# Patient Record
Sex: FEMALE | Race: BLACK OR AFRICAN AMERICAN | Employment: OTHER | URBAN - METROPOLITAN AREA
[De-identification: names, ages, dates, MRNs, and addresses within clinical notes are randomized per-mention and may not be internally consistent; named-entity substitution may affect disease eponyms.]

---

## 2017-07-31 ENCOUNTER — HOSPITAL ENCOUNTER (EMERGENCY)
Age: 82
Discharge: HOME | End: 2017-07-31
Attending: EMERGENCY MEDICINE
Payer: MEDICARE

## 2017-07-31 VITALS
HEIGHT: 62 IN | SYSTOLIC BLOOD PRESSURE: 119 MMHG | WEIGHT: 194 LBS | TEMPERATURE: 97.7 F | OXYGEN SATURATION: 98 % | DIASTOLIC BLOOD PRESSURE: 89 MMHG | BODY MASS INDEX: 35.7 KG/M2 | HEART RATE: 79 BPM | RESPIRATION RATE: 16 BRPM

## 2017-07-31 DIAGNOSIS — M54.31 BILATERAL SCIATICA: Primary | ICD-10-CM

## 2017-07-31 DIAGNOSIS — M54.32 BILATERAL SCIATICA: Primary | ICD-10-CM

## 2017-07-31 PROCEDURE — 99283 EMERGENCY DEPT VISIT LOW MDM: CPT

## 2017-07-31 RX ORDER — METHYLPREDNISOLONE 4 MG/1
TABLET ORAL
Qty: 1 DOSE PACK | Refills: 0 | Status: SHIPPED | OUTPATIENT
Start: 2017-07-31

## 2017-07-31 NOTE — ED PROVIDER NOTES
[address]  EMERGENCY DEPARTMENT HISTORY AND PHYSICAL EXAM       Date: 7/31/2017   Patient Name: Janna Smoker   YOB: 1935  Medical Record Number: [id number]    History of Presenting Illness     Chief Complaint   Patient presents with    Back Pain        History Provided By:  patient    Additional History: 12:50 PM  Janna Smoker is a 80 y.o. female with hx of sciatica who presents to the emergency department C/O intermittent bilateral lower back pain, worse on the right, starting 3 months ago. The pain starts at the upper, lateral buttocks, and occasionally radiates down the right posterior thigh. The pain is exacerbated by sitting and with certain positions, and is alleviated with stretching. Pt states she has follow up with PCP scheduled. Denies numbness, tingling, bowel/bladder incontinence, and any other sxs or complaints. Primary Care Provider: Shruthi Kaur MD   Specialist:    Past History     Past Medical History:   Past Medical History:   Diagnosis Date    Acid reflux     Hypertension     Trigeminal neuralgia syndrome         Past Surgical History:   Past Surgical History:   Procedure Laterality Date    HX BREAST REDUCTION  1985    Bernardino. br.     HX HYSTERECTOMY      Age 48    HX OTHER SURGICAL      trigemenal injections        Family History:   History reviewed. No pertinent family history. Social History:   Social History   Substance Use Topics    Smoking status: Never Smoker    Smokeless tobacco: Never Used    Alcohol use No        Allergies: Allergies   Allergen Reactions    Cetirizine Nausea and Vomiting    Other Medication Rash     Unknown name of medication        Review of Systems   Review of Systems   Gastrointestinal:        (-) bowel incontinence   Genitourinary:        (-) urinary incontinence   Musculoskeletal: Positive for back pain (lower, worse on right). Negative for gait problem.    Neurological: Negative for weakness and numbness. All other systems reviewed and are negative. Physical Exam  Vitals:    07/31/17 1215   BP: 119/89   Pulse: 79   Resp: 16   Temp: 97.7 °F (36.5 °C)   SpO2: 98%   Weight: 88 kg (194 lb)   Height: 5' 2\" (1.575 m)       Physical Exam   Constitutional: She is oriented to person, place, and time. Vital signs are normal. She appears well-developed and well-nourished. No distress. HENT:   Head: Normocephalic and atraumatic. Eyes: Conjunctivae are normal.   Neck: Normal range of motion. Neck supple. Cardiovascular: Normal rate, regular rhythm and normal heart sounds. Pulmonary/Chest: Effort normal and breath sounds normal. No respiratory distress. Abdominal: Soft. Bowel sounds are normal.   Musculoskeletal: Normal range of motion. She exhibits tenderness (to bilateral buttocks (upper and lateral)). Right hip: Normal.        Left hip: Normal.        Lumbar back: Normal.        Back:    Full strength and sensation in bilateral lower extremities. Neurological: She is alert and oriented to person, place, and time. PF/DF intact   Skin: Skin is warm and dry. She is not diaphoretic. Psychiatric: She has a normal mood and affect. Her behavior is normal.   Nursing note and vitals reviewed. Diagnostic Study Results     Labs -    No results found for this or any previous visit (from the past 12 hour(s)). Radiologic Studies -  The following have been ordered and reviewed:  No orders to display       Medical Decision Making   I am the first provider for this patient. I reviewed the vital signs, available nursing notes, past medical history, past surgical history, family history and social history. Vital Signs-Reviewed the patient's vital signs.    Patient Vitals for the past 12 hrs:   Temp Pulse Resp BP SpO2   07/31/17 1215 97.7 °F (36.5 °C) 79 16 119/89 98 %       Pulse Oximetry Analysis - Normal 98% on room air     Procedures:   Procedures    ED Course:  12:50 PM  Initial assessment performed. The patients presenting problems have been discussed, and they are in agreement with the care plan formulated and outlined with them. I have encouraged them to ask questions as they arise throughout their visit. Medications Given in the ED:  Medications - No data to display    Discharge Note:  1:11 PM   Pt has been reexamined. Patient has no new complaints, changes, or physical findings. Care plan outlined and precautions discussed. All medications were reviewed with the patient; will d/c home with Medrol Mariela. All of pt's questions and concerns were addressed. Patient was instructed and agrees to follow up with her PCP, as well as to return to the ED upon further deterioration. Patient is ready to go home. Diagnosis   Clinical Impression:   1. Bilateral sciatica         Follow-up Information     Follow up With Details Comments Contact Info    Romero Avendano MD Go to Your appointment as scheduled [address]   [phone]      [facility] EMERGENCY DEPT  As needed, If symptoms worsen [address] [id number]  [phone]          Discharge Medication List as of 7/31/2017  1:11 PM      START taking these medications    Details   methylPREDNISolone (MEDROL, MARIELA,) 4 mg tablet Take as directed  Indications: INFLAMMATION, SCIATICA, Print, Disp-1 Dose Pack, R-0         CONTINUE these medications which have NOT CHANGED    Details   ondansetron (ZOFRAN ODT) 4 mg disintegrating tablet Take 1 Tab by mouth every eight (8) hours as needed for Nausea. , Print, Disp-6 Tab, R-0      omeprazole (PRILOSEC) 20 mg capsule Take 1 Cap by mouth daily. , Print, Disp-15 Cap, R-0      LISINOPRIL PO Take 20 mg by mouth., Historical Med      CARBAMAZEPINE (TEGRETOL PO) Take 200 mg by mouth., Historical Med      calcium 500 mg tab Take  by mouth., Historical Med      Cholecalciferol, Vitamin D3, (VITAMIN D3) 1,000 unit cap Take  by mouth., Historical Med _______________________________   Attestations: This note is prepared by Brittney Gay, acting as a Scribe for [employer], PA-C. on 1:07 PM on 7/31/2017 . [employer], PA-C.: The scribe's documentation has been prepared under my direction and personally reviewed by me in its entirety.   _______________________________

## 2017-07-31 NOTE — ED NOTES
Amb into ed w/ reports bilateral hip pain radiating down both legs x 3 months now - pt reports this is her typical sciatica - had appointment w/ PCP today - someone made a mistake about the time of her appointment and PCP could not see her today - pt drove self to ED.

## 2017-07-31 NOTE — DISCHARGE INSTRUCTIONS
Sciatica: Care Instructions  Your Care Instructions    Sciatica (say \"fwq-FU-ji-kuh\") is an irritation of one of the sciatic nerves, which come from the spinal cord in the lower back. The sciatic nerves and their branches extend down through the buttock to the foot. Sciatica can develop when an injured disc in the back presses against a spinal nerve root. Its main symptom is pain, numbness, or weakness that is often worse in the leg or foot than in the back. Sciatica often will improve and go away with time. Early treatment usually includes medicines and exercises to relieve pain. Follow-up care is a key part of your treatment and safety. Be sure to make and go to all appointments, and call your doctor if you are having problems. It's also a good idea to know your test results and keep a list of the medicines you take. How can you care for yourself at home? · Take pain medicines exactly as directed. ¨ If the doctor gave you a prescription medicine for pain, take it as prescribed. ¨ If you are not taking a prescription pain medicine, ask your doctor if you can take an over-the-counter medicine. · Use heat or ice to relieve pain. ¨ To apply heat, put a warm water bottle, heating pad set on low, or warm cloth on your back. Do not go to sleep with a heating pad on your skin. ¨ To use ice, put ice or a cold pack on the area for 10 to 20 minutes at a time. Put a thin cloth between the ice and your skin. · Avoid sitting if possible, unless it feels better than standing. · Alternate lying down with short walks. Increase your walking distance as you are able to without making your symptoms worse. · Do not do anything that makes your symptoms worse. When should you call for help? Call 911 anytime you think you may need emergency care. For example, call if:  · You are unable to move a leg at all.   Call your doctor now or seek immediate medical care if:  · You have new or worse symptoms in your legs or buttocks. Symptoms may include:  ¨ Numbness or tingling. ¨ Weakness. ¨ Pain. · You lose bladder or bowel control. Watch closely for changes in your health, and be sure to contact your doctor if:  · You are not getting better as expected. Where can you learn more? Go to http://minor-estefania.info/. Enter [phone] in the search box to learn more about \"Sciatica: Care Instructions. \"  Current as of: March 21, 2017  Content Version: 11.3  © 3886-3553 [employer]. Care instructions adapted under license by [employer] (which disclaims liability or warranty for this information). If you have questions about a medical condition or this instruction, always ask your healthcare professional. [address] any warranty or liability for your use of this information.

## 2017-07-31 NOTE — ED TRIAGE NOTES
Pt states hx of sciatica pt reports has pain down both legs, onset 3 months ago, pt states she had an appointment with her PCP but showed up at wrong time so was unable to be seen, pt reports last episode did not last this long, denies injury   Sepsis Screening completed    (  )Patient meets SIRS criteria. (x  )Patient does not meet SIRS criteria.       SIRS Criteria is achieved when two or more of the following are present   Temperature < 96.8°F (36°C) or > 100.9°F (38.3°C)   Heart Rate > 90 beats per minute   Respiratory Rate > 20 breaths per minute   WBC count > 12,000 or <4,000 or > 10% bands

## 2017-10-19 ENCOUNTER — HOSPITAL ENCOUNTER (EMERGENCY)
Age: 82
Discharge: HOME | End: 2017-10-19
Attending: EMERGENCY MEDICINE
Payer: MEDICARE

## 2017-10-19 VITALS
WEIGHT: 185.63 LBS | RESPIRATION RATE: 20 BRPM | OXYGEN SATURATION: 100 % | BODY MASS INDEX: 34.16 KG/M2 | HEIGHT: 62 IN | DIASTOLIC BLOOD PRESSURE: 99 MMHG | TEMPERATURE: 98.7 F | SYSTOLIC BLOOD PRESSURE: 167 MMHG | HEART RATE: 87 BPM

## 2017-10-19 DIAGNOSIS — M79.651 RIGHT THIGH PAIN: ICD-10-CM

## 2017-10-19 DIAGNOSIS — M54.31 SCIATICA OF RIGHT SIDE: Primary | ICD-10-CM

## 2017-10-19 PROCEDURE — 99282 EMERGENCY DEPT VISIT SF MDM: CPT

## 2017-10-19 RX ORDER — METHYLPREDNISOLONE 4 MG/1
TABLET ORAL
Qty: 1 DOSE PACK | Refills: 0 | Status: SHIPPED | OUTPATIENT
Start: 2017-10-19

## 2017-10-19 NOTE — ED TRIAGE NOTES
Patient with complaints of bilateral thigh pain for several months. Patient has been seen by PCP for this complaint. Sepsis Screening completed    (  )Patient meets SIRS criteria. (  x)Patient does not meet SIRS criteria.       SIRS Criteria is achieved when two or more of the following are present   Temperature < 96.8°F (36°C) or > 100.9°F (38.3°C)   Heart Rate > 90 beats per minute   Respiratory Rate > 20 breaths per minute   WBC count > 12,000 or <4,000 or > 10% bands

## 2017-10-19 NOTE — DISCHARGE INSTRUCTIONS
Follow up with PCP as directed  Take Medications as driected  Take Tylenol for paain and inflammation  Return to the ED for increase pain, difficulty walking, loss of bowel or bladder, numbness or worsening of symptoms      Sciatica: Care Instructions  Your Care Instructions    Sciatica (say \"rrs-AI-ji-kuh\") is an irritation of one of the sciatic nerves, which come from the spinal cord in the lower back. The sciatic nerves and their branches extend down through the buttock to the foot. Sciatica can develop when an injured disc in the back presses against a spinal nerve root. Its main symptom is pain, numbness, or weakness that is often worse in the leg or foot than in the back. Sciatica often will improve and go away with time. Early treatment usually includes medicines and exercises to relieve pain. Follow-up care is a key part of your treatment and safety. Be sure to make and go to all appointments, and call your doctor if you are having problems. It's also a good idea to know your test results and keep a list of the medicines you take. How can you care for yourself at home? · Take pain medicines exactly as directed. ¨ If the doctor gave you a prescription medicine for pain, take it as prescribed. ¨ If you are not taking a prescription pain medicine, ask your doctor if you can take an over-the-counter medicine. · Use heat or ice to relieve pain. ¨ To apply heat, put a warm water bottle, heating pad set on low, or warm cloth on your back. Do not go to sleep with a heating pad on your skin. ¨ To use ice, put ice or a cold pack on the area for 10 to 20 minutes at a time. Put a thin cloth between the ice and your skin. · Avoid sitting if possible, unless it feels better than standing. · Alternate lying down with short walks. Increase your walking distance as you are able to without making your symptoms worse. · Do not do anything that makes your symptoms worse. When should you call for help?   Call [phone] anytime you think you may need emergency care. For example, call if:  · You are unable to move a leg at all. Call your doctor now or seek immediate medical care if:  · You have new or worse symptoms in your legs or buttocks. Symptoms may include:  ¨ Numbness or tingling. ¨ Weakness. ¨ Pain. · You lose bladder or bowel control. Watch closely for changes in your health, and be sure to contact your doctor if:  · You are not getting better as expected. Where can you learn more? Go to http://minor-estefania.info/. Enter [phone] in the search box to learn more about \"Sciatica: Care Instructions. \"  Current as of: March 21, 2017  Content Version: 11.3  © 0919-8785 [employer]. Care instructions adapted under license by [employer] (which disclaims liability or warranty for this information). If you have questions about a medical condition or this instruction, always ask your healthcare professional. [place] [address] any warranty or liability for your use of this information.

## 2017-10-19 NOTE — ED PROVIDER NOTES
[address]  EMERGENCY DEPARTMENT HISTORY AND PHYSICAL EXAM       Date: 10/19/2017   Patient Name: Cristopher Millard   YOB: 1935  Medical Record Number: [id number]    History of Presenting Illness     Chief Complaint   Patient presents with    Leg Pain        History Provided By:  patient    Additional History: 8:16 AM  Cristopher Millard is a 80 y.o. female with PMHx of trigeminal neuralgia syndrome, HTN and acid reflux. who presents to the emergency department C/O lower back pain radiating down her bilateral legs onset \"several months ago. \" Pain is exacerbated with movement. Ambulating does not exacerbate the pain. No associated sxs. Pt reports being seen by her PCP (Dr. Elena Jensen) for same. Pt reports she has been unable to obtain an appointment with Dr. Jimena So recently. Pt reports the Medrol Sheldon alleviated her sxs previously. Pt reports use of Tylenol with occasional relief. Pt reports use of a Omega XL with some relief. Pt reports she would like a referral to another PCP. Patient tearful as the people at her PCP were \"mean to her. \" PSHx includes hysterectomy, bilateral breast reduction and trigeminal injections. Pt denies any other symptoms or complaints at this time. Primary Care Provider: Silvestre Prader, MD   Specialist:    Past History     Past Medical History:   Past Medical History:   Diagnosis Date    Acid reflux     Hypertension     Trigeminal neuralgia syndrome         Past Surgical History:   Past Surgical History:   Procedure Laterality Date    HX BREAST REDUCTION  1985    Bernardino. br.     HX HYSTERECTOMY      Age 48    HX OTHER SURGICAL      trigemenal injections        Family History:   History reviewed. No pertinent family history. Social History:   Social History   Substance Use Topics    Smoking status: Never Smoker    Smokeless tobacco: Never Used    Alcohol use No        Allergies:    Allergies   Allergen Reactions    Cetirizine Nausea and Vomiting  Other Medication Rash     Unknown name of medication        Review of Systems   Review of Systems   Constitutional: Negative for chills and fever. Musculoskeletal: Positive for arthralgias (bilateral leg), back pain (lower) and myalgias (bilateral thigh). All other systems reviewed and are negative. Physical Exam  Vitals:    10/19/17 0815   BP: (!) 167/99   Pulse: 87   Resp: 20   Temp: 98.7 °F (37.1 °C)   SpO2: 100%   Weight: 84.2 kg (185 lb 10 oz)   Height: 5' 2\" (1.575 m)       Physical Exam   Constitutional: She is oriented to person, place, and time. She appears well-developed and well-nourished. HENT:   Head: Normocephalic and atraumatic. Eyes: Conjunctivae are normal.   Neck: Normal range of motion. Cardiovascular: Normal rate and regular rhythm. Pulmonary/Chest: Effort normal and breath sounds normal.   Abdominal: Soft. Bowel sounds are normal. There is no tenderness. Musculoskeletal:        Right hip: Normal.        Right knee: Normal.        Arms:       Legs:  Patient able to ambulate with normal gait    Neurological: She is alert and oriented to person, place, and time. Skin: Skin is warm and dry. Diagnostic Study Results     Labs -    No results found for this or any previous visit (from the past 12 hour(s)). Radiologic Studies -  The following have been ordered and reviewed:  No orders to display           Medical Decision Making   I am the first provider for this patient. I reviewed the vital signs, available nursing notes, past medical history, past surgical history, family history and social history. Vital Signs-Reviewed the patient's vital signs. Patient Vitals for the past 12 hrs:   Temp Pulse Resp BP SpO2   10/19/17 0815 98.7 °F (37.1 °C) 87 20 (!) 167/99 100 %       Pulse Oximetry Analysis - Normal 100% on RA. Old Medical Records: Old medical records. Nursing notes.    Pt was seen here 7/31/17 for lower back pain, that worse on the right, for a couple months. Pain ocassionally radiated down right posterior thigh. Was discharged with a medrol dose pack and told to follow up with her PCP. Procedures:   Procedures    ED Course:  8:16 AM  Initial assessment performed. The patients presenting problems have been discussed, and they are in agreement with the care plan formulated and outlined with them. I have encouraged them to ask questions as they arise throughout their visit. 8:30AM: Patient is a very well appearing 80year old female. She states her pain is very minimal at this time and is primarily here because she would like a new PCP as the people at her PCP were mean to her. She reports good relief of the posterior thigh pain after taking medrol dose pack in July. She states the pain has remained the same and this has been for a long time which points more towards inflammation. Patient given PCP to follow up with and was able to ambulate without difficulty. Patient offering no questions or concerns at this time and understands reasons to return to the ED     Medications Given in the ED:  Medications - No data to display    Discharge Note:  8:42 AM  Pt has been reexamined. Patient has no new complaints, changes, or physical findings. Care plan outlined and precautions discussed. Results were reviewed with the patient. All medications were reviewed with the patient; will d/c home with Medrol Sheldon. All of pt's questions and concerns were addressed. Patient was instructed and agrees to follow up with PCP, as well as to return to the ED upon further deterioration. Patient is ready to go home. Diagnosis   Clinical Impression:   1. Sciatica of right side    2. Right thigh pain           Follow-up Information     Follow up With Details Comments Contact Info    Indira Escoto DO Schedule an appointment as soon as possible for a visit in 2 days For primary care follow up.  [address]      [facility] EMERGENCY DEPT Go to As needed, If symptoms worsen [address] [place] [address]  [phone]          Current Discharge Medication List      START taking these medications    Details   !! methylPREDNISolone (MEDROL, MARIELA,) 4 mg tablet Take as directed  Qty: 1 Dose Pack, Refills: 0       !! - Potential duplicate medications found. Please discuss with provider. CONTINUE these medications which have NOT CHANGED    Details   !! methylPREDNISolone (MEDROL, MARIELA,) 4 mg tablet Take as directed  Indications: INFLAMMATION, SCIATICA  Qty: 1 Dose Pack, Refills: 0       !! - Potential duplicate medications found. Please discuss with provider.          _______________________________   Attestations: This note is prepared by Andres Belle, acting as a Scribe for [facility] FNP-BC on 8:15 AM on 10/19/2017. [facility] FNP-BC: The scribe's documentation has been prepared under my direction and personally reviewed by me in its entirety.   _______________________________

## 2017-10-19 NOTE — ED NOTES
Pt arrives c/o intermittent posterior thigh pain radiating into calf. Pt states she has had this pain for months. Pt had appt with PCP on 10/09 but she did not go because she feels office staff is rude to her. Pt requesting assistance finding another doctor. Pt states she has been taking Omega XL which she ordered off of the TV for pain. Pt states is works but is too expensive.

## 2022-02-14 ENCOUNTER — HOSPITAL ENCOUNTER (EMERGENCY)
Age: 87
Discharge: HOME | End: 2022-02-14
Attending: EMERGENCY MEDICINE
Payer: MEDICARE

## 2022-02-14 VITALS
OXYGEN SATURATION: 100 % | SYSTOLIC BLOOD PRESSURE: 169 MMHG | DIASTOLIC BLOOD PRESSURE: 98 MMHG | HEART RATE: 99 BPM | RESPIRATION RATE: 18 BRPM | TEMPERATURE: 97.8 F

## 2022-02-14 DIAGNOSIS — R51.9 FACIAL PAIN: Primary | ICD-10-CM

## 2022-02-14 DIAGNOSIS — Z86.69 HISTORY OF TRIGEMINAL NEURALGIA: ICD-10-CM

## 2022-02-14 PROCEDURE — 74011250637 HC RX REV CODE- 250/637: Performed by: EMERGENCY MEDICINE

## 2022-02-14 PROCEDURE — 99283 EMERGENCY DEPT VISIT LOW MDM: CPT

## 2022-02-14 PROCEDURE — 96372 THER/PROPH/DIAG INJ SC/IM: CPT

## 2022-02-14 PROCEDURE — 74011250636 HC RX REV CODE- 250/636: Performed by: EMERGENCY MEDICINE

## 2022-02-14 RX ORDER — GABAPENTIN 100 MG/1
100 CAPSULE ORAL ONCE
Status: COMPLETED | OUTPATIENT
Start: 2022-02-14 | End: 2022-02-14

## 2022-02-14 RX ORDER — GABAPENTIN 100 MG/1
100 CAPSULE ORAL 2 TIMES DAILY
Qty: 20 CAPSULE | Refills: 0 | Status: SHIPPED | OUTPATIENT
Start: 2022-02-14 | End: 2022-02-24

## 2022-02-14 RX ORDER — KETOROLAC TROMETHAMINE 30 MG/ML
15 INJECTION, SOLUTION INTRAMUSCULAR; INTRAVENOUS
Status: COMPLETED | OUTPATIENT
Start: 2022-02-14 | End: 2022-02-14

## 2022-02-14 RX ADMIN — KETOROLAC TROMETHAMINE 15 MG: 30 INJECTION, SOLUTION INTRAMUSCULAR at 16:07

## 2022-02-14 RX ADMIN — GABAPENTIN 100 MG: 100 CAPSULE ORAL at 16:07

## 2022-02-14 NOTE — DISCHARGE INSTRUCTIONS
1.  Take the Tegretol at the prescribed dose. 2.  I have prescribed you gabapentin (neurontin) which is a medication that can help with the nerve related pain. Although it is not an opioid narcotic medication, it is possible it can cause some mild sedation so you should see how your body responds to it and use caution when you are trying to walk and you should definitely not drive while taking it. 3.  Follow-up with the ENT resource or an ENT as provided by her PCP. 4.  If you are worsening you should return.

## 2022-02-14 NOTE — ED TRIAGE NOTES
Pt arrives to reporting right sided face pain that began three days ago. Pt states she has had this before and diagnosed with trigeminal neurologia.

## 2022-02-14 NOTE — ED PROVIDER NOTES
EMERGENCY DEPARTMENT HISTORY AND PHYSICAL EXAM    4:04 PM    Date: 2/14/2022  Patient Name: Angelo Sewell    History of Presenting Illness     Chief Complaint   Patient presents with    Facial Pain       History Provided By: Patient  Location/Duration/Severity/Modifying factors   42-year-old female presented to the ED with chief complaint of left-sided mid facial pain. Patient reports that the symptoms onset three or 4 days ago. She has a history of trigeminal neuralgia and this feels the same although not as bad as when she had it years ago it is worse. Patient reports that the pain is described as a throbbing and very sensitive to touch to the left V2 distribution of the cheek. She says that her PCP put her on Tegretol and she has been taking it and sometimes taking more frequently sometimes three times a day. She reports she was on that medication in the past however she was off it for 6 years that she did not have any pain from his rhythm neuralgia. She had injections in the past for trigeminal neuralgia that alleviated it for years however it has since returned. Rates the pain is moderate to severe is actually improved since she been here in the ER. She is not followed currently by ENT. Patient not had any recent illness or any recent dental pain or dental infection. She denies any vomiting or diarrhea or fever          PCP: Freddie Baker MD    Current Facility-Administered Medications   Medication Dose Route Frequency Provider Last Rate Last Admin    ketorolac tromethamine (TORADOL) 60 mg/2 mL injection 15 mg  15 mg IntraMUSCular NOW Uche Patches, DO        gabapentin (NEURONTIN) capsule 100 mg  100 mg Oral ONCE Uche Patches, DO         Current Outpatient Medications   Medication Sig Dispense Refill    gabapentin (NEURONTIN) 100 mg capsule Take 1 Capsule by mouth two (2) times a day for 10 days.  Max Daily Amount: 200 mg. 20 Capsule 0    methylPREDNISolone (MEDROL, MARIELA,) 4 mg tablet Take as directed 1 Dose Pack 0    methylPREDNISolone (MEDROL, MARIELA,) 4 mg tablet Take as directed  Indications: INFLAMMATION, SCIATICA 1 Dose Pack 0    ondansetron (ZOFRAN ODT) 4 mg disintegrating tablet Take 1 Tab by mouth every eight (8) hours as needed for Nausea. 6 Tab 0    omeprazole (PRILOSEC) 20 mg capsule Take 1 Cap by mouth daily. 15 Cap 0    LISINOPRIL PO Take 20 mg by mouth.  CARBAMAZEPINE (TEGRETOL PO) Take 200 mg by mouth.  calcium 500 mg tab Take  by mouth.  Cholecalciferol, Vitamin D3, (VITAMIN D3) 1,000 unit cap Take  by mouth. Past History     Past Medical History:  Past Medical History:   Diagnosis Date    Acid reflux     Hypertension     Trigeminal neuralgia syndrome        Past Surgical History:  Past Surgical History:   Procedure Laterality Date    HX BREAST REDUCTION  1985    Bernardino. br.     HX HYSTERECTOMY      Age 48    HX OTHER SURGICAL      trigemenal injections       Family History:  No family history on file. Social History:  Social History     Tobacco Use    Smoking status: Never Smoker    Smokeless tobacco: Never Used   Substance Use Topics    Alcohol use: No    Drug use: No       Allergies: Allergies   Allergen Reactions    Cetirizine Nausea and Vomiting    Other Medication Rash     Unknown name of medication       I reviewed and confirmed the above information with patient and updated as necessary. Review of Systems     Review of Systems   Constitutional: Negative for chills and fever. HENT: Negative for congestion, rhinorrhea, sinus pressure and sneezing.         + Facial pain   Eyes: Negative for visual disturbance. Respiratory: Negative for cough and shortness of breath. Cardiovascular: Negative for chest pain. Gastrointestinal: Negative for abdominal pain, diarrhea, nausea and vomiting. Genitourinary: Negative for dysuria, frequency and urgency. Musculoskeletal: Negative for back pain and neck pain. Skin: Negative for rash. Neurological: Negative for syncope, numbness and headaches. Physical Exam     Visit Vitals  BP (!) 169/98   Pulse 99   Temp 97.8 °F (36.6 °C)   Resp 18   SpO2 100%       Physical Exam  Vitals and nursing note reviewed. Constitutional:       General: She is not in acute distress. Appearance: Normal appearance. She is normal weight. Comments: Pleasant and appears younger than her stated age   HENT:      Head: Normocephalic and atraumatic. Right Ear: External ear normal.      Left Ear: External ear normal.      Nose: Nose normal. No congestion or rhinorrhea. Mouth/Throat:      Mouth: Mucous membranes are moist.      Pharynx: Oropharynx is clear. No posterior oropharyngeal erythema. Comments: She is a edentulous on the top. There is no gum swelling or any abscess hyperesthesia to the left V2 distribution. Symmetric smile with a normal sensation, otherwise  . Eyes:      Conjunctiva/sclera: Conjunctivae normal.      Pupils: Pupils are equal, round, and reactive to light. Cardiovascular:      Rate and Rhythm: Normal rate and regular rhythm. Pulses: Normal pulses. Heart sounds: Normal heart sounds. No murmur heard. Pulmonary:      Effort: Pulmonary effort is normal. No respiratory distress. Breath sounds: Normal breath sounds. No wheezing, rhonchi or rales. Abdominal:      General: Abdomen is flat. Tenderness: There is no abdominal tenderness. There is no guarding or rebound. Musculoskeletal:         General: No swelling or tenderness. Normal range of motion. Cervical back: Normal range of motion and neck supple. Right lower leg: No edema. Left lower leg: No edema. Skin:     General: Skin is warm and dry. Capillary Refill: Capillary refill takes less than 2 seconds. Findings: No rash. Neurological:      General: No focal deficit present. Mental Status: She is alert.          Diagnostic Study Results     Labs -  No results found for this or any previous visit (from the past 24 hour(s)). Radiologic Studies -   No orders to display           Medical Decision Making   I am the first provider for this patient. I reviewed the vital signs, available nursing notes, past medical history, past surgical history, family history and social history. Vital Signs-Reviewed the patient's vital signs. Records Reviewed: Nursing Notes, Old Medical Records, Previous Radiology Studies and Previous Laboratory Studies (Time of Review: 4:04 PM)      Provider Notes (Medical Decision Making):   MDM  Number of Diagnoses or Management Options  Facial pain  History of trigeminal neuralgia  Diagnosis management comments: 70-year-old female presenting with left-sided facial pain due to presumed trigeminal neuralgia. She has a history of trigeminal neuralgia indicates this is the same. On exam she has no sign of any infectious process nares no swollen there is no nasal turbinate swelling no sign of sinusitis or dental infection. For her here we will give her single dose of IM Toradol she is not on any blood thinners with no history of renal insufficiency or GI hemorrhage. She is on Tegretol which she has been taking more frequently than prescribed advised her just to take the prescribed dose. I will add on a low-dose of gabapentin, counseled her that it can sometimes cause a little bit of sedation and she does live alone so I advised her to exercise caution when she starts taking it. The medication can be adjusted by her PCP when she follows up. We will have her see ENT as well. Advised to return if she is worsening in any way in the meantime. He has no sign of stroke or soft tissue or deep space abscess in the face.     At this time, patient is stable and appropriate for discharge home.  Patient demonstrates understanding of current diagnoses and is in agreement with the treatment plan. Britney Villanueva are advised that while the likelihood of serious underlying condition is low at this point given the evaluation performed today, we cannot fully rule it out. Eveline Carmichael are advised to immediately return with any new symptoms or worsening of current condition. Any Incidental findings were noted on the patient's discharge paperwork as well as verbally directly to the patient, and the appropriate follow-up was given to the patient as far as instructions on testing needed as well as the timeframe.  All questions have been answered. Melania Oropeza is given educational material regarding their diagnoses, including danger symptoms and when to return to the ED. This note was dictated utilizing Dragon voice recognition software. Unfortunately this leads to occasional typographical errors. I apologize in advance if the situation occurs. If questions occur please do not hesitate to contact me directly. Annabelle Bah,           ED Course: Progress Notes, Reevaluation, and Consults:       Procedures    Critical Care Time: N/a    Diagnosis     Clinical Impression:   1. Facial pain    2. History of trigeminal neuralgia        Disposition: Discharge    Follow-up Information     Follow up With Specialties Details Why Contact Info    Espinoza Beckwith MD Family Medicine Call today  [address] [facility] [address]      Bascom Aschoff, MD Otolaryngology, Surgery Call today ENT specialist [place] [address] [address] [facility] EMERGENCY DEPT Emergency Medicine  As needed, If symptoms worsen; or [address]  [phone]           Patient's Medications   Start Taking    GABAPENTIN (NEURONTIN) 100 MG CAPSULE    Take 1 Capsule by mouth two (2) times a day for 10 days. Max Daily Amount: 200 mg. Continue Taking    CALCIUM 500 MG TAB    Take  by mouth. CARBAMAZEPINE (TEGRETOL PO)    Take 200 mg by mouth.     CHOLECALCIFEROL, VITAMIN D3, (VITAMIN D3) 1,000 UNIT CAP    Take  by mouth. LISINOPRIL PO    Take 20 mg by mouth. METHYLPREDNISOLONE (MEDROL, MARIELA,) 4 MG TABLET    Take as directed  Indications: INFLAMMATION, SCIATICA    METHYLPREDNISOLONE (MEDROL, MARIELA,) 4 MG TABLET    Take as directed    OMEPRAZOLE (PRILOSEC) 20 MG CAPSULE    Take 1 Cap by mouth daily. ONDANSETRON (ZOFRAN ODT) 4 MG DISINTEGRATING TABLET    Take 1 Tab by mouth every eight (8) hours as needed for Nausea. These Medications have changed    No medications on file   Stop Taking    No medications on file       Henrietta Jay DO   Emergency Medicine   February 14, 2022, 4:04 PM     This note is dictated utilizing Dragon voice recognition software. Unfortunately this leads to occasional typographical errors using the voice recognition. I apologize in advance if the situation occurs. If questions occur please do not hesitate to contact me directly. Patient was seen  and treated during the context of the COVID-19 pandemic. Contemporary protocols utilized based on the best available evidence, utilizing evolving public health  guidelines and treatment protocols.     Henrietta Jay DO

## 2022-12-04 ENCOUNTER — HOSPITAL ENCOUNTER (EMERGENCY)
Age: 87
Discharge: HOME | End: 2022-12-04
Attending: EMERGENCY MEDICINE
Payer: MEDICARE

## 2022-12-04 VITALS
DIASTOLIC BLOOD PRESSURE: 79 MMHG | SYSTOLIC BLOOD PRESSURE: 182 MMHG | BODY MASS INDEX: 33.49 KG/M2 | HEIGHT: 62 IN | RESPIRATION RATE: 18 BRPM | WEIGHT: 182 LBS | TEMPERATURE: 97.4 F | OXYGEN SATURATION: 100 % | HEART RATE: 72 BPM

## 2022-12-04 DIAGNOSIS — G50.0 TRIGEMINAL NEURALGIA: Primary | ICD-10-CM

## 2022-12-04 PROCEDURE — 96372 THER/PROPH/DIAG INJ SC/IM: CPT

## 2022-12-04 PROCEDURE — 74011250636 HC RX REV CODE- 250/636: Performed by: EMERGENCY MEDICINE

## 2022-12-04 PROCEDURE — 99284 EMERGENCY DEPT VISIT MOD MDM: CPT

## 2022-12-04 RX ORDER — HYDROCODONE BITARTRATE AND ACETAMINOPHEN 5; 325 MG/1; MG/1
1 TABLET ORAL
Qty: 10 TABLET | Refills: 0 | Status: SHIPPED | OUTPATIENT
Start: 2022-12-04 | End: 2022-12-07

## 2022-12-04 RX ORDER — KETOROLAC TROMETHAMINE 30 MG/ML
30 INJECTION, SOLUTION INTRAMUSCULAR; INTRAVENOUS
Status: COMPLETED | OUTPATIENT
Start: 2022-12-04 | End: 2022-12-04

## 2022-12-04 RX ORDER — MORPHINE SULFATE 10 MG/ML
6 INJECTION, SOLUTION INTRAMUSCULAR; INTRAVENOUS
Status: COMPLETED | OUTPATIENT
Start: 2022-12-04 | End: 2022-12-04

## 2022-12-04 RX ADMIN — MORPHINE SULFATE 6 MG: 10 INJECTION INTRAVENOUS at 07:28

## 2022-12-04 RX ADMIN — KETOROLAC TROMETHAMINE 30 MG: 30 INJECTION, SOLUTION INTRAMUSCULAR; INTRAVENOUS at 07:29

## 2022-12-04 NOTE — ED PROVIDER NOTES
EMERGENCY DEPARTMENT HISTORY AND PHYSICAL EXAM      Date: 12/4/2022  Patient Name: Thurnell Saint    History of Presenting Illness     Chief Complaint   Patient presents with    Facial Pain       History Provided By: Patient    HPI: Thurnell Saint, 80 y.o. female with PMHx as noted below presents the emergency department chief complaint of left-sided facial pain. Patient reports chronic left-sided facial pain for years. Patient states she has been diagnosed with trigeminal neuralgia and has intermittent response to multiple treatments including several surgical procedures. Patient states that she will have flareups of severe pain and reports that her symptoms been worse for the last several days. Patient describes it as a sharp, stabbing pain in the left side of her face will wax and wane. Worse with palpation. Denies any facial swelling, new headache, visual changes, difficulty swallowing. Pt denies any other alleviating or exacerbating factors. Additionally, pt specifically denies any recent fever, chills, headache, nausea, vomiting, abdominal pain, CP, SOB, lightheadedness, dizziness, numbness, weakness, BLE swelling, heart palpitations, urinary sxs, diarrhea, constipation, melena, hematochezia, cough, or congestion. PCP: Emigdio Ceja MD    Current Outpatient Medications   Medication Sig Dispense Refill    HYDROcodone-acetaminophen (Norco) 5-325 mg per tablet Take 1 Tablet by mouth every six (6) hours as needed for Pain for up to 3 days. Max Daily Amount: 4 Tablets. 10 Tablet 0    methylPREDNISolone (MEDROL, MARIELA,) 4 mg tablet Take as directed 1 Dose Pack 0    methylPREDNISolone (MEDROL, MARIELA,) 4 mg tablet Take as directed  Indications: INFLAMMATION, SCIATICA 1 Dose Pack 0    ondansetron (ZOFRAN ODT) 4 mg disintegrating tablet Take 1 Tab by mouth every eight (8) hours as needed for Nausea. 6 Tab 0    omeprazole (PRILOSEC) 20 mg capsule Take 1 Cap by mouth daily.  15 Cap 0    LISINOPRIL PO Take 20 mg by mouth. CARBAMAZEPINE (TEGRETOL PO) Take 200 mg by mouth.      calcium 500 mg tab Take  by mouth. Cholecalciferol, Vitamin D3, (VITAMIN D3) 1,000 unit cap Take  by mouth. Past History     Past Medical History:  Past Medical History:   Diagnosis Date    Acid reflux     Hypertension     Trigeminal neuralgia syndrome        Past Surgical History:  Past Surgical History:   Procedure Laterality Date    HX BREAST REDUCTION  1985    Bernardino. br.     HX HYSTERECTOMY      Age 48    HX OTHER SURGICAL      trigemenal injections       Family History:  No family history on file. Social History:  Social History     Tobacco Use    Smoking status: Never    Smokeless tobacco: Never   Substance Use Topics    Alcohol use: No    Drug use: No       Allergies: Allergies   Allergen Reactions    Cetirizine Nausea and Vomiting    Other Medication Rash     Unknown name of medication         Review of Systems   Review of Systems  Constitutional: Negative for fever, chills, and fatigue. HENT: Negative for congestion, sore throat, rhinorrhea, sneezing and neck stiffness   Eyes: Negative for discharge and redness. Respiratory: Negative for  shortness of breath, wheezing   Cardiovascular: Negative for chest pain, palpitations   Gastrointestinal: Negative for nausea, vomiting, abdominal pain, constipation, diarrhea and blood in stool. Genitourinary: Negative for dysuria, hematuria, flank pain, decreased urine volume, discharge,   Musculoskeletal: Negative for myalgias or joint pain . Skin: Negative for rash or lesions . Neurological: Negative weakness, light-headedness, numbness and headaches. Physical Exam   Physical Exam    GENERAL: alert and oriented, no acute distress  EYES: PEERL, No injection, discharge or icterus. ENT: Mucous membranes pink and moist.  There is some tenderness of the left side of the face, no temporal tenderness. There is no facial swelling or asymmetry.   No signs of dental infection. No swelling in the oropharynx  NECK: Supple  LUNGS: Airway patent. Non-labored respirations. Breath sounds clear with good air entry bilaterally. HEART: Regular rate and rhythm. No peripheral edema  ABDOMEN: Non-distended and non-tender, without guarding or rebound. SKIN:  warm, dry  MSK/EXTREMITIES: Without swelling, tenderness or deformity, symmetric with normal ROM  NEUROLOGICAL: Alert, oriented      Diagnostic Study Results     Labs -   No results found for this or any previous visit (from the past 12 hour(s)). Radiologic Studies -   No orders to display     CT Results  (Last 48 hours)      None          CXR Results  (Last 48 hours)      None              Medical Decision Making     ISierra MD am the first provider for this patient and am the attending of record for this patient encounter. I reviewed the vital signs, available nursing notes, past medical history, past surgical history, family history and social history. Vital Signs-Reviewed the patient's vital signs. Patient Vitals for the past 12 hrs:   Temp Pulse Resp BP SpO2   12/04/22 0807 -- 72 18 (!) 182/79 100 %   12/04/22 0635 97.4 °F (36.3 °C) 74 16 (!) 204/86 100 %         Records Reviewed: Nursing Notes and Old Medical Records    Provider Notes (Medical Decision Making): On presentation, the patient is well appearing, in no acute distress with normal vital signs. Presentation consistent with her history of trigeminal neuralgia. She has no temporal tenderness concerning for temporal arteritis. No signs of any infectious etiology on exam.  Patient was given IM morphine with some improvement. Should follow-up with her neurologist and neurosurgeon for further recommendations. ED Course:   Initial assessment performed. The patients presenting problems have been discussed, and they are in agreement with the care plan formulated and outlined with them.   I have encouraged them to ask questions as they arise throughout their visit. Medications   morphine 10 mg/mL injection 6 mg (6 mg IntraMUSCular Given 12/4/22 0728)   ketorolac (TORADOL) injection 30 mg (30 mg IntraMUSCular Given 12/4/22 0729)         PROGRESS  Sera Padgett's  results have been reviewed with her. She has been counseled regarding her diagnosis. She verbally conveys understanding and agreement of the signs, symptoms, diagnosis, treatment and prognosis and additionally agrees to follow up as recommended with Dr. Jung Hadley MD in 24 - 48 hours. She also agrees with the care-plan and conveys that all of her questions have been answered. I have also put together some discharge instructions for her that include: 1) educational information regarding their diagnosis, 2) how to care for their diagnosis at home, as well a 3) list of reasons why they would want to return to the ED prior to their follow-up appointment, should their condition change. Disposition:  home    PLAN:  1. Discharge Medication List as of 12/4/2022  8:02 AM        START taking these medications    Details   HYDROcodone-acetaminophen (Norco) 5-325 mg per tablet Take 1 Tablet by mouth every six (6) hours as needed for Pain for up to 3 days. Max Daily Amount: 4 Tablets., Normal, Disp-10 Tablet, R-0           CONTINUE these medications which have NOT CHANGED    Details   !! methylPREDNISolone (MEDROL, MARIELA,) 4 mg tablet Take as directed, Print, Disp-1 Dose Pack, R-0      !! methylPREDNISolone (MEDROL, MARIELA,) 4 mg tablet Take as directed  Indications: INFLAMMATION, SCIATICA, Print, Disp-1 Dose Pack, R-0      ondansetron (ZOFRAN ODT) 4 mg disintegrating tablet Take 1 Tab by mouth every eight (8) hours as needed for Nausea. , Print, Disp-6 Tab, R-0      omeprazole (PRILOSEC) 20 mg capsule Take 1 Cap by mouth daily. , Print, Disp-15 Cap, R-0      LISINOPRIL PO Take 20 mg by mouth., Historical Med      CARBAMAZEPINE (TEGRETOL PO) Take 200 mg by mouth., Historical Med calcium 500 mg tab Take  by mouth., Historical Med      Cholecalciferol, Vitamin D3, (VITAMIN D3) 1,000 unit cap Take  by mouth., Historical Med       !! - Potential duplicate medications found. Please discuss with provider. 2.   Follow-up Information       Follow up With Specialties Details Why Contact Info    Luis Corbett MD Family Medicine Schedule an appointment as soon as possible for a visit in 2 days  [address] [facility] [address] [facility] [address]  [phone]      [facility] EMERGENCY DEPT Emergency Medicine  If symptoms worsen [address] [facility]  [phone]    Meryle Liming, MD Neurosurgery Schedule an appointment as soon as possible for a visit in 1 day  [facility]  [phone]            Return to ED if worse     Diagnosis     Clinical Impression:   1. Trigeminal neuralgia        Please note that this dictation was completed with Dragon, computer voice recognition software. Quite often unanticipated grammatical, syntax, homophones, and other interpretive errors are inadvertently transcribed by the computer software. Please disregard these errors. Additionally, please excuse any errors that have escaped final proofreading.

## 2022-12-04 NOTE — ED NOTES
Received patient sitting on the bed, alert and oriented. Waiting to be seen by the doctor complaining of facial pain.   Family is at  bedside

## 2022-12-04 NOTE — ED TRIAGE NOTES
C/o left sided facial pain and gum pain that started last night. Pt has a hx of Trigeminal Neuralgia.  No issues swallowing vss